# Patient Record
Sex: FEMALE | Race: WHITE | Employment: OTHER | URBAN - METROPOLITAN AREA
[De-identification: names, ages, dates, MRNs, and addresses within clinical notes are randomized per-mention and may not be internally consistent; named-entity substitution may affect disease eponyms.]

---

## 2018-05-31 ENCOUNTER — TRANSFERRED RECORDS (OUTPATIENT)
Dept: HEALTH INFORMATION MANAGEMENT | Facility: CLINIC | Age: 63
End: 2018-05-31

## 2018-05-31 ENCOUNTER — HOSPITAL ENCOUNTER (OUTPATIENT)
Facility: CLINIC | Age: 63
Setting detail: OBSERVATION
LOS: 1 days | Discharge: HOME | End: 2018-06-01
Attending: EMERGENCY MEDICINE | Admitting: INTERNAL MEDICINE
Payer: COMMERCIAL

## 2018-05-31 DIAGNOSIS — L03.129: ICD-10-CM

## 2018-05-31 DIAGNOSIS — L03.113 CELLULITIS OF RIGHT HAND: ICD-10-CM

## 2018-05-31 PROBLEM — L03.90 CELLULITIS: Status: ACTIVE | Noted: 2018-05-31

## 2018-05-31 LAB
ANION GAP SERPL CALCULATED.3IONS-SCNC: 10 MMOL/L (ref 3–14)
BUN SERPL-MCNC: 7 MG/DL (ref 7–30)
CALCIUM SERPL-MCNC: 9.8 MG/DL (ref 8.5–10.1)
CHLORIDE SERPL-SCNC: 108 MMOL/L (ref 94–109)
CO2 SERPL-SCNC: 24 MMOL/L (ref 20–32)
CREAT SERPL-MCNC: 0.66 MG/DL (ref 0.52–1.04)
CRP SERPL-MCNC: 8.6 MG/L (ref 0–8)
ERYTHROCYTE [SEDIMENTATION RATE] IN BLOOD BY WESTERGREN METHOD: 7 MM/H (ref 0–30)
GFR SERPL CREATININE-BSD FRML MDRD: 90 ML/MIN/1.7M2
GLUCOSE SERPL-MCNC: 88 MG/DL (ref 70–99)
POTASSIUM SERPL-SCNC: 4.2 MMOL/L (ref 3.4–5.3)
SODIUM SERPL-SCNC: 142 MMOL/L (ref 133–144)
URATE SERPL-MCNC: 5.6 MG/DL (ref 2.6–6)

## 2018-05-31 PROCEDURE — 25000128 H RX IP 250 OP 636: Performed by: EMERGENCY MEDICINE

## 2018-05-31 PROCEDURE — 96375 TX/PRO/DX INJ NEW DRUG ADDON: CPT

## 2018-05-31 PROCEDURE — 76882 US LMTD JT/FCL EVL NVASC XTR: CPT | Mod: RT

## 2018-05-31 PROCEDURE — 96365 THER/PROPH/DIAG IV INF INIT: CPT

## 2018-05-31 PROCEDURE — 99285 EMERGENCY DEPT VISIT HI MDM: CPT | Mod: 25

## 2018-05-31 PROCEDURE — 84550 ASSAY OF BLOOD/URIC ACID: CPT | Performed by: EMERGENCY MEDICINE

## 2018-05-31 PROCEDURE — 25000125 ZZHC RX 250: Performed by: EMERGENCY MEDICINE

## 2018-05-31 PROCEDURE — 96366 THER/PROPH/DIAG IV INF ADDON: CPT

## 2018-05-31 PROCEDURE — 85652 RBC SED RATE AUTOMATED: CPT | Performed by: EMERGENCY MEDICINE

## 2018-05-31 PROCEDURE — 96376 TX/PRO/DX INJ SAME DRUG ADON: CPT

## 2018-05-31 PROCEDURE — 80048 BASIC METABOLIC PNL TOTAL CA: CPT | Performed by: EMERGENCY MEDICINE

## 2018-05-31 PROCEDURE — 25000132 ZZH RX MED GY IP 250 OP 250 PS 637: Performed by: PHYSICIAN ASSISTANT

## 2018-05-31 PROCEDURE — 86140 C-REACTIVE PROTEIN: CPT | Performed by: EMERGENCY MEDICINE

## 2018-05-31 PROCEDURE — 99219 ZZC INITIAL OBSERVATION CARE,LEVL II: CPT | Performed by: PHYSICIAN ASSISTANT

## 2018-05-31 PROCEDURE — G0378 HOSPITAL OBSERVATION PER HR: HCPCS

## 2018-05-31 PROCEDURE — 25000128 H RX IP 250 OP 636: Performed by: PHYSICIAN ASSISTANT

## 2018-05-31 RX ORDER — HYDROMORPHONE HYDROCHLORIDE 1 MG/ML
0.5 INJECTION, SOLUTION INTRAMUSCULAR; INTRAVENOUS; SUBCUTANEOUS
Status: DISCONTINUED | OUTPATIENT
Start: 2018-05-31 | End: 2018-05-31

## 2018-05-31 RX ORDER — CLINDAMYCIN PHOSPHATE 900 MG/50ML
900 INJECTION, SOLUTION INTRAVENOUS ONCE
Status: COMPLETED | OUTPATIENT
Start: 2018-05-31 | End: 2018-05-31

## 2018-05-31 RX ORDER — HYDROMORPHONE HYDROCHLORIDE 1 MG/ML
0.3 INJECTION, SOLUTION INTRAMUSCULAR; INTRAVENOUS; SUBCUTANEOUS
Status: DISCONTINUED | OUTPATIENT
Start: 2018-05-31 | End: 2018-06-01 | Stop reason: HOSPADM

## 2018-05-31 RX ORDER — DIPHENOXYLATE HCL/ATROPINE 2.5-.025MG
1 TABLET ORAL 4 TIMES DAILY PRN
COMMUNITY

## 2018-05-31 RX ORDER — LORAZEPAM 0.5 MG/1
0.5 TABLET ORAL EVERY 8 HOURS PRN
Status: DISCONTINUED | OUTPATIENT
Start: 2018-05-31 | End: 2018-06-01 | Stop reason: HOSPADM

## 2018-05-31 RX ORDER — CEFAZOLIN SODIUM 1 G/50ML
1 INJECTION, SOLUTION INTRAVENOUS EVERY 8 HOURS
Status: DISCONTINUED | OUTPATIENT
Start: 2018-05-31 | End: 2018-06-01 | Stop reason: HOSPADM

## 2018-05-31 RX ORDER — LORAZEPAM 0.5 MG/1
0.5 TABLET ORAL EVERY 8 HOURS PRN
COMMUNITY

## 2018-05-31 RX ORDER — LEVOTHYROXINE SODIUM 100 UG/1
100 TABLET ORAL
Status: DISCONTINUED | OUTPATIENT
Start: 2018-06-01 | End: 2018-06-01 | Stop reason: HOSPADM

## 2018-05-31 RX ORDER — ONDANSETRON 4 MG/1
4 TABLET, ORALLY DISINTEGRATING ORAL EVERY 6 HOURS PRN
Status: DISCONTINUED | OUTPATIENT
Start: 2018-05-31 | End: 2018-06-01 | Stop reason: HOSPADM

## 2018-05-31 RX ORDER — NALOXONE HYDROCHLORIDE 0.4 MG/ML
.1-.4 INJECTION, SOLUTION INTRAMUSCULAR; INTRAVENOUS; SUBCUTANEOUS
Status: DISCONTINUED | OUTPATIENT
Start: 2018-05-31 | End: 2018-06-01 | Stop reason: HOSPADM

## 2018-05-31 RX ORDER — LIDOCAINE 40 MG/G
CREAM TOPICAL
Status: DISCONTINUED | OUTPATIENT
Start: 2018-05-31 | End: 2018-06-01 | Stop reason: HOSPADM

## 2018-05-31 RX ORDER — HYDROMORPHONE HYDROCHLORIDE 1 MG/ML
0.5 INJECTION, SOLUTION INTRAMUSCULAR; INTRAVENOUS; SUBCUTANEOUS ONCE
Status: COMPLETED | OUTPATIENT
Start: 2018-05-31 | End: 2018-05-31

## 2018-05-31 RX ORDER — ACETAMINOPHEN 325 MG/1
650 TABLET ORAL EVERY 4 HOURS PRN
Status: DISCONTINUED | OUTPATIENT
Start: 2018-05-31 | End: 2018-06-01 | Stop reason: HOSPADM

## 2018-05-31 RX ORDER — ONDANSETRON 2 MG/ML
4 INJECTION INTRAMUSCULAR; INTRAVENOUS EVERY 6 HOURS PRN
Status: DISCONTINUED | OUTPATIENT
Start: 2018-05-31 | End: 2018-06-01 | Stop reason: HOSPADM

## 2018-05-31 RX ORDER — SUMATRIPTAN 50 MG/1
50-100 TABLET, FILM COATED ORAL PRN
COMMUNITY

## 2018-05-31 RX ORDER — CEFAZOLIN SODIUM 2 G/100ML
2 INJECTION, SOLUTION INTRAVENOUS ONCE
Status: COMPLETED | OUTPATIENT
Start: 2018-05-31 | End: 2018-05-31

## 2018-05-31 RX ORDER — LEVOTHYROXINE SODIUM 100 UG/1
100 TABLET ORAL DAILY
COMMUNITY

## 2018-05-31 RX ORDER — ONDANSETRON 2 MG/ML
4 INJECTION INTRAMUSCULAR; INTRAVENOUS ONCE
Status: COMPLETED | OUTPATIENT
Start: 2018-05-31 | End: 2018-05-31

## 2018-05-31 RX ORDER — HYDROCODONE BITARTRATE AND ACETAMINOPHEN 5; 325 MG/1; MG/1
1-2 TABLET ORAL EVERY 4 HOURS PRN
Status: DISCONTINUED | OUTPATIENT
Start: 2018-05-31 | End: 2018-06-01 | Stop reason: HOSPADM

## 2018-05-31 RX ORDER — NAPROXEN 500 MG/1
500 TABLET ORAL 2 TIMES DAILY WITH MEALS
Status: DISCONTINUED | OUTPATIENT
Start: 2018-05-31 | End: 2018-06-01 | Stop reason: HOSPADM

## 2018-05-31 RX ORDER — KETOROLAC TROMETHAMINE 15 MG/ML
15 INJECTION, SOLUTION INTRAMUSCULAR; INTRAVENOUS ONCE
Status: COMPLETED | OUTPATIENT
Start: 2018-05-31 | End: 2018-05-31

## 2018-05-31 RX ORDER — CLINDAMYCIN PHOSPHATE 600 MG/50ML
600 INJECTION, SOLUTION INTRAVENOUS EVERY 8 HOURS
Status: CANCELLED | OUTPATIENT
Start: 2018-05-31

## 2018-05-31 RX ADMIN — CLINDAMYCIN PHOSPHATE 900 MG: 18 INJECTION, SOLUTION INTRAVENOUS at 14:39

## 2018-05-31 RX ADMIN — HYDROCODONE BITARTRATE AND ACETAMINOPHEN 1 TABLET: 5; 325 TABLET ORAL at 22:54

## 2018-05-31 RX ADMIN — NAPROXEN 500 MG: 500 TABLET ORAL at 18:22

## 2018-05-31 RX ADMIN — KETOROLAC TROMETHAMINE 15 MG: 15 INJECTION, SOLUTION INTRAMUSCULAR; INTRAVENOUS at 14:13

## 2018-05-31 RX ADMIN — HYDROMORPHONE HYDROCHLORIDE 0.5 MG: 10 INJECTION, SOLUTION INTRAMUSCULAR; INTRAVENOUS; SUBCUTANEOUS at 14:12

## 2018-05-31 RX ADMIN — ONDANSETRON 4 MG: 2 INJECTION INTRAMUSCULAR; INTRAVENOUS at 15:27

## 2018-05-31 RX ADMIN — CEFAZOLIN SODIUM 2 G: 2 INJECTION, SOLUTION INTRAVENOUS at 14:39

## 2018-05-31 RX ADMIN — ONDANSETRON 4 MG: 2 INJECTION INTRAMUSCULAR; INTRAVENOUS at 14:39

## 2018-05-31 RX ADMIN — HYDROMORPHONE HYDROCHLORIDE 0.5 MG: 10 INJECTION, SOLUTION INTRAMUSCULAR; INTRAVENOUS; SUBCUTANEOUS at 15:07

## 2018-05-31 RX ADMIN — CEFAZOLIN SODIUM 1 G: 1 INJECTION, SOLUTION INTRAVENOUS at 22:03

## 2018-05-31 NOTE — PHARMACY-ADMISSION MEDICATION HISTORY
Admission medication history interview status for this patient is complete. See [facility] admission navigator for allergy information, prior to admission medications and immunization status.     Medication history interview source(s):Patient  Medication history resources (including written lists, pill bottles, clinic record):None    Changes made to PTA medication list:  Added: all meds  Deleted: none  Changed: none     Actions taken by pharmacist (provider contacted, etc):None     Additional medication history information:None    Medication reconciliation/reorder completed by provider prior to medication history? No    Prior to Admission medications    Medication Sig Last Dose Taking? Auth Provider   diphenoxylate-atropine (LOMOTIL) 2.5-0.025 MG per tablet Take 1 tablet by mouth 4 times daily as needed  Yes Reported, Patient   levothyroxine (SYNTHROID/LEVOTHROID) 100 MCG tablet Take 100 mcg by mouth daily 5/31/2018 Yes Reported, Patient   LORazepam (ATIVAN) 0.5 MG tablet Take 0.5 mg by mouth every 8 hours as needed for anxiety  Yes Reported, Patient   SUMAtriptan (IMITREX) 50 MG tablet Take  mg by mouth as needed for migraine More than a month  Reported, Patient

## 2018-05-31 NOTE — ED TRIAGE NOTES
Pt was seen in clinic today with a 36 hour hx of pain in base of thumb that radiates up arm. Pt denies injury. There is a red streak up pts arm to mid forearm. Pt c/o severe pain.

## 2018-05-31 NOTE — H&P
[facility]   Outpatient Observation Unit     Internal Medicine History and Physical        Date of Admission: 5/31/2018    Assessment & Plan  Peggy Seipel is a 63 year old woman with a history of hypothyroidism, migraines, and depression/anxiety who is registered to observation status for further management of a suspected skin and soft tissue infection of the right hand.     #Right Hand Cellulitis and Lymphangitis. Atraumatic sx onset ~48 hrs ago w/ acute worsening of pain and swelling over right thumb base this AM - area of erythema w/ faint streaking up the forearm has been outlined in ED. Afebrile, no leukocytosis, ESR 7, CRP 8.6. Outside x-ray w/o acute findings and joint spaces were preserved. POC US in ED w/ no abscess or effusion in joint (1st MCP) or fascial planes. At this time is felt to be most c/w SSTI, however warrants observation stay to monitor response to antibiotic therapy. Unclear inciting factor for this. Not felt to be c/w septic joint or necrotizing fasciitis at this time (low probability by LRNEC score). Would expect symmetry if rheumatologic; gout and CPPD remain in differential.   - Received doses of Ancef and clindamycin in ED. Ok to continue just Ancef for now. No purulence or MRSA risk factors.   - Skin check by RN at least Q4hrs overnight; notify provider if change beyond marked borders. Elevate RUE above level of heart and can try moist warm compresses.   - Check uric acid level.   - Schedule naproxen BID WM. Gets nausea w/ opioids and is allergic to Tramadol - tolerated Norco w/ a prior surgery so will order this. Has IV Dilaudid for breakthrough (received in ED).   - Low threshold to involve general surgery and broaden abx if worsening exam overnight vs. orthopedics if pain not improved in AM.     #Hypothyroidism. Continue home Synthroid.     #Depression/Anxiety. Continue home PRN Ativan monitoring for over sedation w/ concurrent use of opioids here.    Diet:  regular  Fluids: N/A  DVT Prophylaxis: ambulatory  Code Status: full    Shell Jaimes PA-C  Hospitalist Service    Chief Complaint   Right Wrist Pain     History is obtained from the patient    History of Present Illness   Patient first presented to primary care clinic today for further evaluation of right wrist pain and swelling. Pain is located at the base of the thumb and has been progressive since waking up this morning. It is throbbing, worse with movement (but also present at rest), and unrelieved with Tylenol, ibuprofen, ice. First noticed this more mildly night before last, and then a faint amount of redness in the area last night. Noticed a red line up the arm from the wrist today. Has felt intermittently flushed but no documented fevers or chills. Denies any recent injuries or overuse, and no recent gardening/yardwork. Is right hand dominant. Retired - has been doing routine housework (laundry, grocery shopping). No cats or scratches. No history of diabetes or other immunocompromised state. No prior history of problems in this area or any orthopedic surgeries. No other joints are painful.     In clinic had an unrevealing x-ray and CBC, and was referred to ED for further management. In ED had normal ESR, mildly elevated CRP, and point of care US showing no effusion in the joint space or fascial planes. Was given doses of Ancef and clindamycin and called for observation admit. Was not felt to need surgical consultation at this time.     Review of Systems   10 point ROS performed and negative unless otherwise noted in HPI    Past Medical History    I have reviewed this patient's medical history and updated it with pertinent information if needed.   Past Medical History:   Diagnosis Date     Adenomatous polyp of colon 2011    [facility] - single polyp removed     Adjustment disorder with anxiety      Hypothyroidism      Major depressive disorder      Migraine         Past Surgical History   I have reviewed this  "patient's surgical history and updated it with pertinent information if needed.  Past Surgical History:   Procedure Laterality Date     APPENDECTOMY  1988     AS REMOVAL OF OVARIAN CYST(S)  1988     AS VAG HYST,RMV TUBE/OVARY  2008     LAPAROSCOPY  1996     MAMMOPLASTY REDUCTION       TONSILLECTOMY  1968        Social History   Social History   Substance Use Topics     Smoking status: Former Smoker     Smokeless tobacco: Never Used     Alcohol use Yes      Comment: socially       Family History   I have reviewed this patient's family history and updated it with pertinent information if needed.   Family History   Problem Relation Age of Onset     Cataracts Father        Prior to Admission Medications   Multiple Vitamin (MULTIVITAMINS OR)             Active   SUMAtriptan (IMITREX) 50 MG tablet    Indications: Migraine without status migrainosus, not intractable, unspecified migraine type Take 1-2 Tabs by mouth as needed (Take 1-2 tablets by mouth as needed.). 9 Tab   12 02/20/2017   Active   levothyroxine (SYNTHROID) 100 MCG tablet    Indications: Acquired hypothyroidism Take 1 Tab by mouth daily. 90 Tab   3 05/02/2018   Active   diphenoxylate-atropine (LOMOTIL) 2.5-0.025 MG tablet    Indications: Adjustment disorder with anxiety Take 1 Tab by mouth 4 times daily as needed. 20 Tab   0 05/02/2018   Active   LORazepam (ATIVAN) 0.5 MG tablet    Indications: Adjustment disorder with anxiety Take 1 Tab by mouth every 8 hours as needed for Anxiety. 30 Tab   0 05/08/2018   Active       Allergies   Allergies   Allergen Reactions     Tramadol Rash       Physical Exam   BP (!) 125/33 (BP Location: Right arm)  Pulse 61  Temp 96  F (35.6  C) (Oral)  Resp 17  Ht 1.588 m (5' 2.5\")  Wt 81.6 kg (180 lb)  SpO2 96%  BMI 32.4 kg/m2  GENERAL: Alert and oriented x 3. Sitting up in bed, appears comfortable. Conversant.   HEENT: Anicteric sclera. Mucous membranes moist.   CV: RRR. S1, S2. No murmurs appreciated.   RESPIRATORY: Effort " normal on room air. Lungs CTAB with no wheezing, rales, rhonchi.   GI: Abdomen soft, non distended, non tender.   MUSCULOSKELETAL: Edema and pain w/ palpation over the right thenar eminence. Unable to move thumb d/t pain. 2+ radial pulses; symmetric.   NEUROLOGICAL: No focal deficits. Moves all extremities.   SKIN: Skin of the right upper extremity is intact w/ no open wounds. There is an area of faint erythema over the right thenar eminence w/ streaking a third of the way up the volar forearm.     Data   Data reviewed today: I reviewed all medications, new labs and imaging results over the last 24 hours. I personally reviewed outside right wrist x-ray and CBC.   FINDINGS:  No acute fracture or dislocation identified. Chronic ossicle at the tip of the ulnar styloid may represent a chronic nonunited fracture fragment. Mild triscaphe osteoarthritis. Small ossicle at the volar aspect of the joint space may represent an intra-articular body. Ulnar positive variance with cystic change in the ulnar aspect of the base of the lunate compatible with ulnar abutment syndrome. Preserved joint spaces in the wrist.  5/31/18:   WBC 9.1  Hgb 13.6  Hct 40.6  Plt 152

## 2018-05-31 NOTE — ED PROVIDER NOTES
"  History     Chief Complaint:  Arm Pain      HPI   Peggy S Seipel is a 63 year old female who presents with right hand pain erythema and streaking redness up the forearm.  Symptoms started 36 hours prior to arrival here seen in urgent care and sent over for concern for possible necrotizing soft tissue infection.  She denies any trauma breaks in the skin history of gout or other crystal induced arthritis.  Blood work was done at urgent care and an x-ray was performed.  White count was normal and the x-ray was unremarkable.  She is right-hand dominant    Allergies:  Tramadol    Medications:    The patient is currently on no regular medications.      Past Medical History:    The patient denies any significant past medical history.    Past Surgical History:    The patient does not have any pertinent past surgical history  Family / Social History:    No past pertinent family history.     Social History:  Former Smoker.   Positive for alcohol use, socially.   Marital Status:   [2]    Review of Systems   ROS: 10 point ROS neg other than the symptoms noted above in the HPI.      Physical Exam     Patient Vitals for the past 24 hrs:   BP Temp Temp src Pulse Resp SpO2 Height Weight   05/31/18 1500 136/62 - - - - 97 % - -   05/31/18 1445 143/65 - - - - 93 % - -   05/31/18 1430 122/59 - - - - 96 % - -   05/31/18 1313 149/66 98.6  F (37  C) Temporal 76 18 100 % 1.575 m (5' 2\") 82.6 kg (182 lb)       Physical Exam   HENT:   Right Ear: External ear normal.   Left Ear: External ear normal.   Nose: Nose normal.   Eyes: Right eye exhibits no discharge. Left eye exhibits no discharge. No scleral icterus.   Cardiovascular: Intact distal pulses.    Pulmonary/Chest: Effort normal.   Speaking in full sentences   Musculoskeletal:   4 x 3 cm area of erythema volar surface the distal forearm over the distal radius to the base of the thenar eminence there is proximal streaking lymphangitis to approximately mid forearm.  There is no " crepitus there is no breaks in the skin her wrist range of motion limited by pain but there is no wrist joint effusion.  Distal CMS is intact.  Elbow range of motion is painless.   Neurological:   Alert    No gross motor or sensory deficits   Skin: Skin is warm.   Psychiatric: She has a normal mood and affect. Judgment normal.   Nursing note and vitals reviewed.        Emergency Department Course   Laboratory:  CRP inflammation: 8.6 (H)  Erythrocyte sed rate: 7    Procedures:        Interventions:  1412 Dilaudid, 0.5 mg, IV injection  1413 Toradol, 15 mg, IV injection  1439 Clindamycin 900 mg IV   Ancef 2 g IV   Zofran, 4 mg, IV injection  1507 Dilaudid, 0.5 mg, IV injection  1527 Zofran, 4 mg, IV injection    Emergency Department Course:  Nursing notes and vitals reviewed. 1421 I performed an exam of the patient as documented above.     IV inserted. Medicine administered as documented above. Blood drawn. This was sent to the lab for further testing, results above.    1449 I rechecked the patient. Bedside US was obtained, per the above procedure note.     1521  I consulted with SHIVA German, working with Dr. Nuno of the hospitalist services. They are in agreement to accept the patient for admission.    Findings and plan explained to the Patient who consents to admission. Discussed the patient with SHIVA Mosley, working with Dr. Nuno, who will admit the patient to a medical bed for further monitoring, evaluation, and treatment.    Impression & Plan    Medical Decision Making:  Peggy S Seipel is a 63 year old female who is here with right hand cellulitis and lymphangitis. I do not appreciate concern for fasciitis based on US here.  XR at urgent care was unremarkable. WBC was normal at outside clinic. We will initiate antibiotics here until we know we have control of the infection.     Diagnosis:    ICD-10-CM   1. Cellulitis of right hand L03.113   2. Acute lymphangitis of hand L03.129      Disposition:  Admitted to [facility]IV[facility] German, working with Dr. Nuno of the hospitalist services.    I, Martha Benavidez, am serving as a scribe on 5/31/2018 at 2:21 PM to personally document services performed by Vladimir Montanez MD based on my observations and the provider's statements to me.     Martha Benavidez  5/31/2018   [facility] EMERGENCY DEPARTMENT       Vladimir Montanez MD  05/31/18 5004     21.55

## 2018-05-31 NOTE — PLAN OF CARE
Problem: Patient Care Overview  Goal: Plan of Care/Patient Progress Review  Outcome: No Change  ROOM # 224    Living Situation (if not independent, order SW consult): Home  Facility name:  : [facility]    Activity level at baseline: Independent  Activity level on admit: Independent      Patient registered to observation; given Patient Bill of Rights; given the opportunity to ask questions about observation status and their plan of care.  Patient has been oriented to the observation room, bathroom and call light is in place.    Discussed discharge goals and expectations with patient/family.

## 2018-05-31 NOTE — IP AVS SNAPSHOT
[facility] Observation Department    [address]    [facility] [address]    Phone:  [phone]                                       After Visit Summary   5/31/2018    Peggy S Seipel    MRN: [id number]           After Visit Summary Signature Page     I have received my discharge instructions, and my questions have been answered. I have discussed any challenges I see with this plan with the nurse or doctor.    ..........................................................................................................................................  Patient/Patient Representative Signature      ..........................................................................................................................................  Patient Representative Print Name and Relationship to Patient    ..................................................               ................................................  Date                                            Time    ..........................................................................................................................................  Reviewed by Signature/Title    ...................................................              ..............................................  Date                                                            Time

## 2018-05-31 NOTE — PLAN OF CARE
Problem: Patient Care Overview  Goal: Plan of Care/Patient Progress Review  Outcome: No Change  PRIMARY DIAGNOSIS: SOFT TISSUE INFECTIONS  OUTPATIENT/OBSERVATION GOALS TO BE MET BEFORE DISCHARGE:  1. Vitals sign stable or return to baseline: Yes    2. Tolerating oral antibiotics or has home infusion set up if applicable: No    3. Pain status: Improved with use of alternative comfort measures i.e.: ice and positioning    4. Return to near baseline physical activity: Yes    Discharge Planner Nurse   Safe discharge environment identified: Yes  Barriers to discharge: Yes       Entered by: Rashard Haskins 05/31/2018 5:04 PM     Please review provider order for any additional goals.     Nurse to notify provider when observation goals have been met and patient is ready for discharge.    Pt A&Ox4, VSS on RA, Reports pain 8/10 but denies need for medication at this time. Red area to right wrist outlined and pink receding. Up Independently in room. IV Ancef.

## 2018-05-31 NOTE — ED NOTES
"[facility]  ED Nurse Handoff Report    Peggy S Seipel is a 63 year old female   ED Chief complaint: Arm Pain  . ED Diagnosis:   Final diagnoses:   Cellulitis of right hand   Acute lymphangitis of hand     Allergies:   Allergies   Allergen Reactions     Tramadol Rash       Code Status: not on file  Activity level - Baseline/Home:  Independent. Activity Level - Current:   Stand with Assist. Lift room needed: No. Bariatric: No   Needed: No   Isolation: No. Infection: Not Applicable.     Vital Signs:   Vitals:    05/31/18 1313 05/31/18 1430 05/31/18 1445 05/31/18 1500   BP: 149/66 122/59 143/65 136/62   Pulse: 76      Resp: 18      Temp: 98.6  F (37  C)      TempSrc: Temporal      SpO2: 100% 96% 93% 97%   Weight: 82.6 kg (182 lb)      Height: 1.575 m (5' 2\")          Cardiac Rhythm:  ,      Pain level: 0-10 Pain Scale: 8  Patient confused: No. Patient Falls Risk: Yes.   Elimination Status: has not yet voided   Patient Report - Initial Complaint: Arm Pain. Focused Assessment: Pt has redness and extreme pain in right wrist. Denies any trauma.  Pain starts in base of thumb and radiates up arm.  There is a red streak that runs up the arm.  This started 36 hours ago.  No other concerns.  ABCs intact, alert and orientated.  Tests Performed:   Labs Ordered and Resulted from Time of ED Arrival Up to the Time of Departure from the ED   CRP INFLAMMATION - Abnormal; Notable for the following:        Result Value    CRP Inflammation 8.6 (*)     All other components within normal limits   ERYTHROCYTE SEDIMENTATION RATE AUTO     POC US SOFT TISSUE    (Results Pending)       Treatments provided: See MAR  Family Comments: Not present at this time  OBS brochure/video discussed/provided to patient:  Yes  ED Medications:   Medications   HYDROmorphone (PF) (DILAUDID) injection 0.5 mg (0.5 mg Intravenous Given 5/31/18 1412)   clindamycin (CLEOCIN) infusion 900 mg (900 mg Intravenous New Bag 5/31/18 1439) "   HYDROmorphone (PF) (DILAUDID) injection 0.5 mg (0.5 mg Intravenous Given 5/31/18 1507)   ketorolac (TORADOL) injection 15 mg (15 mg Intravenous Given 5/31/18 1413)   ceFAZolin (ANCEF) intermittent infusion 2 g in 100 mL dextrose PRE-MIX (2 g Intravenous Given 5/31/18 1439)   ondansetron (ZOFRAN) injection 4 mg (4 mg Intravenous Given 5/31/18 1439)   ondansetron (ZOFRAN) injection 4 mg (4 mg Intravenous Given 5/31/18 1527)     Drips infusing:  Yes -antibiotic  For the majority of the shift, the patient's behavior Green. Interventions performed were Rounding.     Severe Sepsis OR Septic Shock Diagnosis Present: No      ED Nurse Name/Phone Number: Hoa Villarreal,   3:10 PM    RECEIVING UNIT ED HANDOFF REVIEW    Above ED Nurse Handoff Report was reviewed: Yes  Reviewed by: Rashard Haskins on May 31, 2018 at 3:57 PM

## 2018-05-31 NOTE — IP AVS SNAPSHOT
MRN:[id number]                      After Visit Summary   5/31/2018    Peggy S Seipel    MRN: [id number]           Thank you!     Thank you for choosing [facility] for your care. Our goal is always to provide you with excellent care. Hearing back from our patients is one way we can continue to improve our services. Please take a few minutes to complete the written survey that you may receive in the mail after you visit. If you would like to speak to someone directly about your visit please contact Patient Relations at [phone]. Thank you!          Patient Information     Date Of Birth          1955        Designated Caregiver       Most Recent Value    Caregiver    Will someone help with your care after discharge? yes    Name of designated caregiver Sung    Phone number of caregiver see chart    Caregiver address same as Pt      About your hospital stay     You were admitted on:  May 31, 2018 You last received care in the:  [facility] Observation Department    You were discharged on:  June 1, 2018        Reason for your hospital stay       You were admitted for concerns of right hand cellulitis. Your hand/arm significantly improved with IV antibiotics. We would like you to take 1 week of oral antibiotics. You should take zofran 30 minutes before the antibiotic to prevent nausea. You should also take a probiotic to protect your gut pawel.    You may use tylenol or ibuprofen for pain.  You should elevate your right arm to decrease swelling and you can use warm compresses for pain as well.                  Who to Call     For medical emergencies, please call 911.  For non-urgent questions about your medical care, please call your primary care provider or clinic, None          Attending Provider     Provider Specialty    Vladimir Montanez MD Emergency Medicine    Je Nuno MD Internal Medicine    Marcellus Elizalde MD Internal Medicine        "Primary Care Provider Fax #    Physician No Ref-Primary [phone]       When to contact your care team       You need to be seen if your develop a fever or the pain/redness/swelling worsens.                  After Care Instructions     Activity       Your activity upon discharge: activity as tolerated            Diet       Follow this diet upon discharge: Regular                  Follow-up Appointments     Follow-up and recommended labs and tests        Follow up with primary care provider, Physician No Ref-Primary, within 7 days to evaluate medication change and for hospital follow- up.  No follow up labs or test are needed.                             Pending Results     No orders found for last 3 day(s).            Statement of Approval     Ordered          06/01/18 1153  I have reviewed and agree with all the recommendations and orders detailed in this document.  EFFECTIVE NOW     Approved and electronically signed by:  Janey Douglas PA-C             Admission Information     Date & Time Provider Department Dept. Phone    5/31/2018 Marcellus Elizalde MD [facility] Observation Department [phone]      Your Vitals Were     Blood Pressure Pulse Temperature Respirations Height Weight    129/44 (BP Location: Left arm) 66 96.8  F (36  C) (Oral) 18 1.588 m (5' 2.5\") 81.6 kg (180 lb)    Pulse Oximetry BMI (Body Mass Index)                95% 32.4 kg/m2          [employer] Information     [employer] lets you send messages to your doctor, view your test results, renew your prescriptions, schedule appointments and more. To sign up, go to [email or web address] . Click on \"Log in\" on the left side of the screen, which will take you to the Welcome page. Then click on \"Sign up Now\" on the right side of the page.     You will be asked to enter the access code listed below, as well as some personal information. Please follow the directions to create your username and password.     Your " access code is: [email or web address]  Expires: 2018  1:47 PM     Your access code will  in 90 days. If you need help or a new code, please call your [facility] clinic or [phone].        Care EveryWhere ID     This is your Care EveryWhere ID. This could be used by other organizations to access your [facility] medical records  [id number]        Equal Access to Services     MEGHNA POLANCO : Hadii bridger ku hadasho Soomaali, waaxda luqadaha, qaybta kaalmada adeegyada, brown kelsey virgilioeldon bess eric nancy . So [facility] [phone].    ATENCIÓN: Si garth goddard, tiene a wagner disposición servicios gratuitos de asistencia lingüística. Llame al [phone].    We comply with applicable federal civil rights laws and [place] laws. We do not discriminate on the basis of race, color, national origin, age, disability, sex, sexual orientation, or gender identity.               Review of your medicines      START taking        Dose / Directions    Acidophilus Lactobacillus Caps   Used for:  Cellulitis of right hand        Dose:  1 capsule   Take 1 capsule by mouth 2 times daily   Quantity:  60 capsule   Refills:  0       Cefadroxil 1 g Tabs   Used for:  Cellulitis of right hand        Dose:  1 tablet   Take 1 tablet by mouth daily   Quantity:  7 tablet   Refills:  0       ondansetron 4 MG ODT tab   Commonly known as:  ZOFRAN-ODT   Used for:  Cellulitis of right hand        Dose:  4 mg   Take 1 tablet (4 mg) by mouth every 6 hours as needed for nausea or vomiting   Quantity:  20 tablet   Refills:  0         CONTINUE these medicines which have NOT CHANGED        Dose / Directions    diphenoxylate-atropine 2.5-0.025 MG per tablet   Commonly known as:  LOMOTIL        Dose:  1 tablet   Take 1 tablet by mouth 4 times daily as needed   Refills:  0       levothyroxine 100 MCG tablet   Commonly known as:  SYNTHROID/LEVOTHROID        Dose:  100 mcg   Take 100 mcg by mouth daily   Refills:  0       LORazepam 0.5 MG tablet   Commonly known  as:  ATIVAN        Dose:  0.5 mg   Take 0.5 mg by mouth every 8 hours as needed for anxiety   Refills:  0       SUMAtriptan 50 MG tablet   Commonly known as:  IMITREX        Dose:   mg   Take  mg by mouth as needed for migraine   Refills:  0            Where to get your medicines      These medications were sent to [place], [place] - [address] [facility]  [address] [address] [address]     Phone:  [phone]     Acidophilus Lactobacillus Caps    Cefadroxil 1 g Tabs    ondansetron 4 MG ODT tab                Protect others around you: Learn how to safely use, store and throw away your medicines at www.disposemymeds.org.        ANTIBIOTIC INSTRUCTION     You've Been Prescribed an Antibiotic - Now What?  Your healthcare team thinks that you or your loved one might have an infection. Some infections can be treated with antibiotics, which are powerful, life-saving drugs. Like all medications, antibiotics have side effects and should only be used when necessary. There are some important things you should know about your antibiotic treatment.      Your healthcare team may run tests before you start taking an antibiotic.    Your team may take samples (e.g., from your blood, urine or other areas) to run tests to look for bacteria. These test can be important to determine if you need an antibiotic at all and, if you do, which antibiotic will work best.      Within a few days, your healthcare team might change or even stop your antibiotic.    Your team may start you on an antibiotic while they are working to find out what is making you sick.    Your team might change your antibiotic because test results show that a different antibiotic would be better to treat your infection.    In some cases, once your team has more information, they learn that you do not need an antibiotic at all. They may find out that you don't have an infection, or that the antibiotic you're taking  won't work against your infection. For example, an infection caused by a virus can't be treated with antibiotics. Staying on an antibiotic when you don't need it is more likely to be harmful than helpful.      You may experience side effects from your antibiotic.    Like all medications, antibiotics have side effects. Some of these can be serious.    Let you healthcare team know if you have any known allergies when you are admitted to the hospital.    One significant side effect of nearly all antibiotics is the risk of severe and sometimes deadly diarrhea caused by Clostridium difficile (C. Difficile). This occurs when a person takes antibiotics because some good germs are destroyed. Antibiotic use allows C. diificile to take over, putting patients at high risk for this serious infection.    As a patient or caregiver, it is important to understand your or your loved one's antibiotic treatment. It is especially important for caregivers to speak up when patients can't speak for themselves. Here are some important questions to ask your healthcare team.    What infection is this antibiotic treating and how do you know I have that infection?    What side effects might occur from this antibiotic?    How long will I need to take this antibiotic?    Is it safe to take this antibiotic with other medications or supplements (e.g., vitamins) that I am taking?     Are there any special directions I need to know about taking this antibiotic? For example, should I take it with food?    How will I be monitored to know whether my infection is responding to the antibiotic?    What tests may help to make sure the right antibiotic is prescribed for me?      Information provided by:  www.cdc.gov/getsmart  U.S. Department of Health and Human Services  Centers for disease Control and Prevention  National Center for Emerging and Zoonotic Infectious Diseases  Division of Healthcare Quality Promotion             Medication List: This is a  list of all your medications and when to take them. Check marks below indicate your daily home schedule. Keep this list as a reference.      Medications           Morning Afternoon Evening Bedtime As Needed    Acidophilus Lactobacillus Caps   Take 1 capsule by mouth 2 times daily                                Cefadroxil 1 g Tabs   Take 1 tablet by mouth daily                                diphenoxylate-atropine 2.5-0.025 MG per tablet   Commonly known as:  LOMOTIL   Take 1 tablet by mouth 4 times daily as needed                                levothyroxine 100 MCG tablet   Commonly known as:  SYNTHROID/LEVOTHROID   Take 100 mcg by mouth daily                                LORazepam 0.5 MG tablet   Commonly known as:  ATIVAN   Take 0.5 mg by mouth every 8 hours as needed for anxiety                                ondansetron 4 MG ODT tab   Commonly known as:  ZOFRAN-ODT   Take 1 tablet (4 mg) by mouth every 6 hours as needed for nausea or vomiting   Last time this was given:  4 mg on 6/1/2018  7:49 AM                                SUMAtriptan 50 MG tablet   Commonly known as:  IMITREX   Take  mg by mouth as needed for migraine

## 2018-06-01 VITALS
OXYGEN SATURATION: 99 % | HEIGHT: 63 IN | HEART RATE: 66 BPM | BODY MASS INDEX: 31.89 KG/M2 | RESPIRATION RATE: 18 BRPM | DIASTOLIC BLOOD PRESSURE: 59 MMHG | SYSTOLIC BLOOD PRESSURE: 148 MMHG | WEIGHT: 180 LBS | TEMPERATURE: 96 F

## 2018-06-01 LAB — CRP SERPL-MCNC: 14.4 MG/L (ref 0–8)

## 2018-06-01 PROCEDURE — 36415 COLL VENOUS BLD VENIPUNCTURE: CPT | Performed by: PHYSICIAN ASSISTANT

## 2018-06-01 PROCEDURE — G0378 HOSPITAL OBSERVATION PER HR: HCPCS

## 2018-06-01 PROCEDURE — 25000125 ZZHC RX 250: Performed by: PHYSICIAN ASSISTANT

## 2018-06-01 PROCEDURE — 96376 TX/PRO/DX INJ SAME DRUG ADON: CPT

## 2018-06-01 PROCEDURE — 86140 C-REACTIVE PROTEIN: CPT | Performed by: PHYSICIAN ASSISTANT

## 2018-06-01 PROCEDURE — 25000128 H RX IP 250 OP 636: Performed by: PHYSICIAN ASSISTANT

## 2018-06-01 PROCEDURE — 99217 ZZC OBSERVATION CARE DISCHARGE: CPT | Performed by: PHYSICIAN ASSISTANT

## 2018-06-01 PROCEDURE — 25000132 ZZH RX MED GY IP 250 OP 250 PS 637: Performed by: PHYSICIAN ASSISTANT

## 2018-06-01 RX ORDER — ONDANSETRON 4 MG/1
4 TABLET, ORALLY DISINTEGRATING ORAL EVERY 6 HOURS PRN
Qty: 20 TABLET | Refills: 0 | Status: SHIPPED | OUTPATIENT
Start: 2018-06-01

## 2018-06-01 RX ORDER — CEFADROXIL 1000 MG/1
1 TABLET ORAL DAILY
Qty: 7 TABLET | Refills: 0 | Status: SHIPPED | OUTPATIENT
Start: 2018-06-01

## 2018-06-01 RX ORDER — LACTOBACILLUS ACIDOPHILUS 500MM CELL
1 CAPSULE ORAL 2 TIMES DAILY
Qty: 60 CAPSULE | Refills: 0 | Status: SHIPPED | OUTPATIENT
Start: 2018-06-01

## 2018-06-01 RX ADMIN — HYDROCODONE BITARTRATE AND ACETAMINOPHEN 1 TABLET: 5; 325 TABLET ORAL at 04:56

## 2018-06-01 RX ADMIN — ONDANSETRON 4 MG: 4 TABLET, ORALLY DISINTEGRATING ORAL at 07:49

## 2018-06-01 RX ADMIN — CEFAZOLIN SODIUM 1 G: 1 INJECTION, SOLUTION INTRAVENOUS at 05:32

## 2018-06-01 RX ADMIN — CEFAZOLIN SODIUM 1 G: 1 INJECTION, SOLUTION INTRAVENOUS at 13:44

## 2018-06-01 NOTE — PLAN OF CARE
Problem: Patient Care Overview  Goal: Plan of Care/Patient Progress Review  PRIMARY DIAGNOSIS: SOFT TISSUE INFECTIONS  OUTPATIENT/OBSERVATION GOALS TO BE MET BEFORE DISCHARGE:  1. Vitals sign stable or return to baseline: Yes    2. Tolerating oral antibiotics or has home infusion set up if applicable: Yes    3. Pain status: Controlled with oral medications    4. Return to near baseline physical activity: Yes    Discharge Planner Nurse   Safe discharge environment identified: Yes  Barriers to discharge: Not once medically cleared        Entered by: Griselda Mason 06/01/2018 1:23 AM     A&Ox4, vitally stable on RA. Afebrile. Denies any pain at this time, norco x1 given prior and was effective. Denies nausea. Swelling/redness improving per pt report, area outlined and not extending boarders. Area is light pink in color, swelling to R thumb base and fingers. Denies any numbness/tingling. On IV Ancef q8h. Independent in room. Will continue to monitor.      Please review provider order for any additional goals.     Nurse to notify provider when observation goals have been met and patient is ready for discharge.

## 2018-06-01 NOTE — PLAN OF CARE
Problem: Patient Care Overview  Goal: Plan of Care/Patient Progress Review  Outcome: Adequate for Discharge Date Met: 06/01/18  Patient's After Visit Summary was reviewed with patient and daughter.   Patient verbalized understanding of After Visit Summary, recommended follow up and was given an opportunity to ask questions.   Discharge medications sent home with patient/family: YES - Zofran, probiotic and antibiotic.   Discharged with daughter.    OBSERVATION patient END time: 2:47 PM

## 2018-06-01 NOTE — DISCHARGE SUMMARY
Discharge Summary  Hospitalist Service    Peggy S Seipel MRN# [id number]   YOB: 1955 Age: 63 year old     Date of Admission:  5/31/2018  Date of Discharge:  6/1/2018  Admitting Physician: Marcellus Elizalde MD  Discharge Physician: Janey Douglas PA-C  Discharging Service: Hospitalist Service     Primary Provider: No Ref-Primary, Physician  Primary Care Physician Phone Number: None         Discharge Diagnoses/Problem Oriented Hospital Course (Providers):    Peggy S Seipel was admitted on 5/31/2018 by Marcellus Elizalde MD and I would refer you to their history and physical. Patient had acute onset of right anterior hand/forearm cellulitis with no drainage or systemic symptoms. Ultrasound in ED did not show abscess/effusion. CRP elevated but uric acid normal. She was started on Ancef with improvement in redness.  The following problems were addressed during her hospitalization:    1. Right anterior forearm/hand cellulitis:  Atraumatic with no systemic signs. Improved remarkably with Ancef IV. No concerns for tensosynovitis. Given 24 hours of IV antibiotics and will complete 1 week course of Cefadroxil along with probiotic. Zofran also given as she becomes nauseated with antibiotics. Instructed to elevate arm, use tylenol/ibuprofen for pain and warm compresses.         Code Status:      Full Code        Brief Hospital Stay Summary Sent Home With Patient in AVS:        Reason for your hospital stay       You were admitted for concerns of right hand cellulitis. Your hand/arm   significantly improved with IV antibiotics. We would like you to take 1   week of oral antibiotics. You should take zofran 30 minutes before the   antibiotic to prevent nausea. You should also take a probiotic to protect   your gut pawel.    You may use tylenol or ibuprofen for pain.  You should elevate your right arm to decrease swelling and you can use   warm compresses for pain as well.                                     Pending Results:        Unresulted Labs Ordered in the Past 30 Days of this Admission     No orders found for last 61 day(s).            Discharge Instructions and Follow-Up:      Follow-up Appointments     Follow-up and recommended labs and tests        Follow up with primary care provider, Physician No Ref-Primary, within 7   days to evaluate medication change and for hospital follow- up.  No follow   up labs or test are needed.                      Discharge Disposition:      Discharged to home         Discharge Medications:        Current Discharge Medication List      START taking these medications    Details   Acidophilus Lactobacillus CAPS Take 1 capsule by mouth 2 times daily  Qty: 60 capsule, Refills: 0    Comments: 1 bottle of any probiotic  Associated Diagnoses: Cellulitis of right hand      Cefadroxil 1 g TABS Take 1 tablet by mouth daily  Qty: 7 tablet, Refills: 0    Associated Diagnoses: Cellulitis of right hand      ondansetron (ZOFRAN-ODT) 4 MG ODT tab Take 1 tablet (4 mg) by mouth every 6 hours as needed for nausea or vomiting  Qty: 20 tablet, Refills: 0    Associated Diagnoses: Cellulitis of right hand         CONTINUE these medications which have NOT CHANGED    Details   diphenoxylate-atropine (LOMOTIL) 2.5-0.025 MG per tablet Take 1 tablet by mouth 4 times daily as needed      levothyroxine (SYNTHROID/LEVOTHROID) 100 MCG tablet Take 100 mcg by mouth daily      LORazepam (ATIVAN) 0.5 MG tablet Take 0.5 mg by mouth every 8 hours as needed for anxiety      SUMAtriptan (IMITREX) 50 MG tablet Take  mg by mouth as needed for migraine               Allergies:         Allergies   Allergen Reactions     Tramadol Rash           Consultations This Hospital Stay:      No consultations were requested during this admission         Condition and Physical on Discharge:      Discharge condition: Stable   Vitals: Blood pressure 112/44, pulse 56, temperature 96.5  F (35.8  C), temperature  "source Oral, resp. rate 18, height 1.588 m (5' 2.5\"), weight 81.6 kg (180 lb), SpO2 95 %.     Constitutional: Alert and orientated   Lungs: CTAB   Cardiovascular: RRR with no murmur   Abdomen: Bowel sounds are present with no tenderness   Skin: Redness has improved and appears more pink with marked improvement. No significant pain with movement of wrist or fingers.    Other:          Discharge Time:      Less than 30 minutes.        Image Results From This Hospital Stay (For Non-EPIC Providers):        Results for orders placed or performed during the hospital encounter of 05/31/18   POC US SOFT TISSUE    Impression    [facility] Procedure Note     Limited Bedside ED Ultrasound of Soft Tissue:    PROCEDURE: PERFORMED BY: Dr. Vladimir Montanez  INDICATIONS/SYMPTOM: Skin redness, evaluate for abscess, cellulitis or foreign body  PROBE: High frequency linear probe  BODY LOCATION: Soft tissue located on extremity     FINDINGS: Cobblestoning of soft tissue: present   Hypoechoic fluid (ie abscess) identified: absent      INTERPRETATION:  The soft tissue and muscle layers were evaluated.  There is no fluid along fascial planes nor is there air artifact in the subcutaneous tissue   Findings indicate cellulitis    IMAGE DOCUMENTATION: Images were archived to PACs system.             Most Recent Lab Results In EPIC (For Non-EPIC Providers):    Most Recent 3 CBC's:No lab results found.   Most Recent 3 BMP's:  Recent Labs   Lab Test  05/31/18   1414   NA  142   POTASSIUM  4.2   CHLORIDE  108   CO2  24   BUN  7   CR  0.66   ANIONGAP  10   NELA  9.8   GLC  88     Most Recent 3 Troponin's:No lab results found.  Most Recent 3 INR's:No lab results found.  Most Recent 2 LFT's:No lab results found.  Most Recent Cholesterol Panel:No lab results found.  Most Recent 6 Bacteria Isolates From Any Culture (See EPIC Reports for Culture Details):No lab results found.  Most Recent TSH, T4 and HgbA1c: No lab results found.    "

## 2018-06-01 NOTE — PLAN OF CARE
Problem: Patient Care Overview  Goal: Plan of Care/Patient Progress Review  PRIMARY DIAGNOSIS: SOFT TISSUE INFECTIONS  OUTPATIENT/OBSERVATION GOALS TO BE MET BEFORE DISCHARGE:  1. Vitals sign stable or return to baseline: Yes    2. Tolerating oral antibiotics or has home infusion set up if applicable: Yes    3. Pain status: Controlled with oral medications    4. Return to near baseline physical activity: Yes    Discharge Planner Nurse   Safe discharge environment identified: Yes  Barriers to discharge: Not once medically cleared        Entered by: Griselda Mason 06/01/2018 5:36 AM     A&Ox4, vitally stable, afebrile. C/o 5/10 pain to R wrist, prn norco given. Denies nausea. Swelling/redness improving per pt report, area outlined and not extending boarders. Area is light pink in color, swelling to R thumb base and fingers. Denies any numbness/tingling. On IV Ancef q8h. Independent in room. Will continue to monitor.      Please review provider order for any additional goals.     Nurse to notify provider when observation goals have been met and patient is ready for discharge.

## 2018-06-01 NOTE — PLAN OF CARE
Problem: Patient Care Overview  Goal: Plan of Care/Patient Progress Review  Outcome: Improving  PRIMARY DIAGNOSIS: SOFT TISSUE INFECTIONS  OUTPATIENT/OBSERVATION GOALS TO BE MET BEFORE DISCHARGE:  1. Vitals sign stable or return to baseline: Yes    2. Tolerating oral antibiotics or has home infusion set up if applicable: No    3. Pain status: Improved-controlled with oral pain medications.    4. Return to near baseline physical activity: Yes    Discharge Planner Nurse   Safe discharge environment identified: Yes  Barriers to discharge: Yes       Entered by: Rashard Haskins 05/31/2018 9:34 PM     Please review provider order for any additional goals.     Nurse to notify provider when observation goals have been met and patient is ready for discharge.    Pt A&Ox4, VSS on RA, Reports pain 8/10 and states relief with scheduled naproxen and ice. Red area to right wrist outlined and pink receding. Up Independently in room. IV Ancef q8hrs

## 2018-06-01 NOTE — PLAN OF CARE
"Problem: Patient Care Overview  Goal: Plan of Care/Patient Progress Review  Outcome: Improving  PRIMARY DIAGNOSIS: SOFT TISSUE INFECTIONS  OUTPATIENT/OBSERVATION GOALS TO BE MET BEFORE DISCHARGE:  1. Vitals sign stable or return to baseline: Yes     2. Tolerating oral antibiotics or has home infusion set up if applicable: Receiving IV Ancef at this time.      3. Pain status: Reports mild R hand \"tightness\"/discomfort. Denied need for intervention.      4. Return to near baseline physical activity: Yes    Vitally stable. Afebrile. Reports pain and swelling of R hand improved since yesterday. Reports able to functionally use R hand today. Light pink in color, within borders of previously drawn outline. Reports \"tightness\" but denies numbness/tingling. To receive next dose of IV Ancef this afternoon.      Discharge Planner Nurse   Safe discharge environment identified: Yes  Barriers to discharge: No       Entered by: Rossi Leo, 6/1/2018     Please review provider order for any additional goals.      Nurse to notify provider when observation goals have been met and patient is ready for discharge.      "

## 2018-06-01 NOTE — PROGRESS NOTES
Brief Medicine Note    Routine recheck of hand at 2240 this evening w/ patient noting symptomatic improvement and no expansion of erythema beyond marked borders. Continue to monitor.     Shell Jaimes PA-C  Hospitalist Service

## 2018-06-01 NOTE — PLAN OF CARE
"Problem: Patient Care Overview  Goal: Plan of Care/Patient Progress Review  Outcome: Improving  PRIMARY DIAGNOSIS: SOFT TISSUE INFECTIONS  OUTPATIENT/OBSERVATION GOALS TO BE MET BEFORE DISCHARGE:  1. Vitals sign stable or return to baseline: Yes      2. Tolerating oral antibiotics or has home infusion set up if applicable: Receiving IV Ancef at this time.       3. Pain status: Reports mild R hand \"tightness\"/discomfort. Denied need for intervention.       4. Return to near baseline physical activity: Yes     Vitally stable. Afebrile. Swelling/discomfort to R hand unchanged since this AM. No border progression of site. Reports intermittent nausea; PRN Zofran given which was effective per patient. To receive another dose of IV Ancef at 1400 and then discharge to home after.       Discharge Planner Nurse   Safe discharge environment identified: Yes  Barriers to discharge: No       Entered by: Rossi Leo, 6/1/2018      Please review provider order for any additional goals.       Nurse to notify provider when observation goals have been met and patient is ready for discharge.         "